# Patient Record
(demographics unavailable — no encounter records)

---

## 2024-10-30 NOTE — HEALTH RISK ASSESSMENT
[Good] : ~his/her~  mood as  good [Yes] : Yes [Monthly or less (1 pt)] : Monthly or less (1 point) [1 or 2 (0 pts)] : 1 or 2 (0 points) [Never (0 pts)] : Never (0 points) [0] : 2) Feeling down, depressed, or hopeless: Not at all (0) [PHQ-2 Negative - No further assessment needed] : PHQ-2 Negative - No further assessment needed [Never] : Never [With Family] : lives with family [Employed] : employed [] :  [Fully functional (bathing, dressing, toileting, transferring, walking, feeding)] : Fully functional (bathing, dressing, toileting, transferring, walking, feeding) [Fully functional (using the telephone, shopping, preparing meals, housekeeping, doing laundry, using] : Fully functional and needs no help or supervision to perform IADLs (using the telephone, shopping, preparing meals, housekeeping, doing laundry, using transportation, managing medications and managing finances) [Reports changes in hearing] : Reports no changes in hearing [Reports changes in vision] : Reports no changes in vision [de-identified] : Wife and daughter  [de-identified] : on leave of absence from work

## 2024-10-30 NOTE — PHYSICAL EXAM
[Normal] : normal gait, coordination grossly intact, no focal deficits and deep tendon reflexes were 2+ and symmetric [Normal Affect] : the affect was normal [Normal Insight/Judgement] : insight and judgment were intact [de-identified] : 2-3cm x 0.5cm bony protrusion at top of cranium hard bone

## 2024-10-30 NOTE — HISTORY OF PRESENT ILLNESS
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 Obsessions have been evolving. Mentally repeatedly goes over emails/labels/messages multiple times, repeatedly checks his windows to make sure they're locked, stove, oven. Great difficulty fighting the sensation to break free from these compulsions. Fear that he's going to cause a fire by accident (has never caused a fire before), fear of accidentally causing harm to someone, fear of bunny an STD and passing it on, fear of doing work around the house that would be faulty and cause harm to his family. No SI or HI. No auditory of visual hallucination. His sleep and appetite have been poor. Often skips meals because he's not hungry. Just recently he's started to wake up throughout the night, sometimes is hard for him to go to sleep.

## 2024-10-30 NOTE — ASSESSMENT
[FreeTextEntry1] : . CPE Labs  Vit D low previously recheck, recommend 2000U QD supplementation  OCD worsening since last evaluation. Obsessions and compulsive thoughts. Deterioration of his daily life. Weight loss of ~15 lbs since last year, attributed to decreased appetite during his obsession episodes. Patient is considering medication but would like to wait for Psychiatry at this time. Will refer to . His therapist is also trying to get him a Psychiatrist.   Patient concerned over bump on his cranium, has been there for a long time but not since he was a child. XR rule out bony lesion.    Reviewed diet, exercise (150 min/moderate intensity exercise weekly), lifestyle modifications. Discussed the benefits of weight loss with pt, and risks associated with obesity. Pt is receptive to lifestyle modification techniques to lose weight - at least 30mins-1hr aerobic exercises/day x5 days per week advised. Decreased food portion sizes, increase in whole grains, assorted vegetables and fruits and decreased sugar beverages discussed and encouraged   Flu vaccine today

## 2025-07-22 NOTE — ASSESSMENT
[FreeTextEntry1] : c/w Benadryl PRN will prescribe additional short course of Prednisone will check blood work to assess for alpha gal syndrome, blood drawn in office will refer patient to an allergist

## 2025-07-22 NOTE — HISTORY OF PRESENT ILLNESS
[FreeTextEntry8] : 36 year old male presents for evaluation after developing itchy hives all over his body. This started 3 nights ago while he was away [facility]. The next day, he had developed some mild swelling to his tongue and lower right lip. He went to the ER- was given Prednisone, Benadryl and Famotidine in the ER. He was d/c home with a script for an Epi-Pen (which he didn't ) and one dose Prednisone 50 mg (which he took yesterday at noon). He has been taking Benadryl every 6 hours. He felt better yesterday up until 9 pm when he developed itchy hives. Today- he c/o itchy hives localized to his legs, arms, and feet. He does not recall any new foods. He did eat a pastrami sandwich a few hours prior to the initial reaction. No new creams. He had a dose decrease of his Fluvoxamine (150 mg dose). He is concerned about having potential alpha gal syndrome- has h/o tick bites.

## 2025-07-22 NOTE — REVIEW OF SYSTEMS
[Itching] : itching [Skin Rash] : skin rash [Fever] : no fever [Chills] : no chills [Chest Pain] : no chest pain [Shortness Of Breath] : no shortness of breath

## 2025-07-22 NOTE — PHYSICAL EXAM
[No Acute Distress] : no acute distress [Well Nourished] : well nourished [Well Developed] : well developed [Normal Appearance] : was normal in appearance [Neck Supple] : was supple [No Respiratory Distress] : no respiratory distress  [Clear to Auscultation] : lungs were clear to auscultation bilaterally [Normal Rate] : normal rate  [Regular Rhythm] : with a regular rhythm [Normal S1, S2] : normal S1 and S2 [No Murmur] : no murmur heard [Alert and Oriented x3] : oriented to person, place, and time [de-identified] : + maculopapular rash to legs